# Patient Record
Sex: MALE | Race: WHITE
[De-identification: names, ages, dates, MRNs, and addresses within clinical notes are randomized per-mention and may not be internally consistent; named-entity substitution may affect disease eponyms.]

---

## 2017-10-09 ENCOUNTER — HOSPITAL ENCOUNTER (OUTPATIENT)
Dept: HOSPITAL 45 - C.LAB | Age: 47
Discharge: HOME | End: 2017-10-09
Attending: FAMILY MEDICINE
Payer: COMMERCIAL

## 2017-10-09 DIAGNOSIS — I10: Primary | ICD-10-CM

## 2017-10-09 DIAGNOSIS — M54.9: ICD-10-CM

## 2017-10-09 DIAGNOSIS — E66.01: ICD-10-CM

## 2017-10-09 LAB
ALBUMIN/GLOB SERPL: 0.9 {RATIO} (ref 0.9–2)
ALP SERPL-CCNC: 95 U/L (ref 45–117)
ALT SERPL-CCNC: 36 U/L (ref 12–78)
ANION GAP SERPL CALC-SCNC: 7 MMOL/L (ref 3–11)
AST SERPL-CCNC: 18 U/L (ref 15–37)
BUN SERPL-MCNC: 20 MG/DL (ref 7–18)
BUN/CREAT SERPL: 23.2 (ref 10–20)
CALCIUM SERPL-MCNC: 8.3 MG/DL (ref 8.5–10.1)
CHLORIDE SERPL-SCNC: 107 MMOL/L (ref 98–107)
CHOLEST/HDLC SERPL: 3.7 {RATIO}
CO2 SERPL-SCNC: 28 MMOL/L (ref 21–32)
CREAT SERPL-MCNC: 0.87 MG/DL (ref 0.6–1.4)
GLOBULIN SER-MCNC: 3.8 GM/DL (ref 2.5–4)
GLUCOSE SERPL-MCNC: 94 MG/DL (ref 70–99)
GLUCOSE UR QL: 42 MG/DL
KETONES UR QL STRIP: 74 MG/DL
NITRITE UR QL STRIP: 200 MG/DL (ref 0–150)
PH UR: 156 MG/DL (ref 0–200)
POTASSIUM SERPL-SCNC: 3.7 MMOL/L (ref 3.5–5.1)
SODIUM SERPL-SCNC: 142 MMOL/L (ref 136–145)
TSH SERPL-ACNC: 2.33 UIU/ML (ref 0.3–4.5)
VERY LOW DENSITY LIPOPROT CALC: 40 MG/DL

## 2018-03-13 ENCOUNTER — HOSPITAL ENCOUNTER (OUTPATIENT)
Dept: HOSPITAL 45 - C.RDSM | Age: 48
Discharge: HOME | End: 2018-03-13
Attending: PHYSICAL MEDICINE & REHABILITATION
Payer: COMMERCIAL

## 2018-03-13 DIAGNOSIS — M54.9: Primary | ICD-10-CM

## 2018-04-11 ENCOUNTER — HOSPITAL ENCOUNTER (EMERGENCY)
Dept: HOSPITAL 45 - C.EDB | Age: 48
Discharge: HOME | End: 2018-04-11
Payer: COMMERCIAL

## 2018-04-11 VITALS
HEIGHT: 65.98 IN | BODY MASS INDEX: 50.62 KG/M2 | WEIGHT: 315 LBS | BODY MASS INDEX: 50.62 KG/M2 | WEIGHT: 315 LBS | HEIGHT: 65.98 IN

## 2018-04-11 VITALS
SYSTOLIC BLOOD PRESSURE: 189 MMHG | DIASTOLIC BLOOD PRESSURE: 94 MMHG | OXYGEN SATURATION: 94 % | TEMPERATURE: 97.7 F | HEART RATE: 93 BPM

## 2018-04-11 DIAGNOSIS — K08.89: Primary | ICD-10-CM

## 2018-04-11 DIAGNOSIS — I10: ICD-10-CM

## 2018-04-12 NOTE — EMERGENCY ROOM VISIT NOTE
ED Visit Note


First contact with patient:  17:57


CHIEF COMPLAINT:  Toothache. 





HISTORY OF PRESENT ILLNESS:  Mr. Montenegro is a 47-year-old white male who 

ambulates into the complaining of dental pain.


Historically patient reports he has history dental disease but has not been 

able to follow-up with the dentist for multiple years because he has had no 

dental insurance.


He goes on to report that he had a crown over the left maxillary area that was 

no longer present.  He said he believes he may have swallowed the crown 3 days 

ago but is unsure all he knows is that the crown is missing.  He reports he has 

been having ongoing progressive dental pain in the left maxillary area.  He 

describes this as a deep achy and throbbing sensation.  He rates his discomfort 

8/10.  The pain is nonradiating.  The pain worsens with palpation of the gum 

and the decayed teeth in this area.  As well as chewing.  He has not identified 

any alleviating factors related to the pain.  He reports he has been taken over-

the-counter medication without relief of his discomfort.  He denies any 

associated symptoms including fevers, chills, sweats, skin eruptions, facial 

swelling, sore throat, upper respiratory tract symptoms, difficulty swallowing, 

decreased appetite, nausea/vomiting.


 


REVIEW OF SYSTEMS:  As noted above in History of Present Illness.  8 body 

systems were reviewed with this patient and found to be negative unless noted 

above otherwise.





PMH: Hypertension, status post lumbar spinal fusion.


CURRENT MEDICATION: Patient denies..


ALLERGIES TO MEDICATION: Patient denies.


SOCIAL HISTORY: Patient is unemployed; he feels safe in his home environment; 

he denies tobacco and alcohol use.





PHYSICAL EXAM: 


Vital Signs: 








  Date Time  Temp Pulse Resp B/P (MAP) Pulse Ox O2 Delivery O2 Flow Rate FiO2


 


4/11/18 18:00 36.5 93 18 189/94 94 Room Air  





General: 47 year-old white male in mild distress due to pain, nontoxic appearing

, afebrile and hemodynamically stable.


Neurological: Awake, alert and oriented to person, place and time.  Answering 

questions appropriately and following commands. 


Skin: Warm, dry and pink.  No facial swelling or erythema.


HEENT: Atraumatic and normocephalic. Oral cavity is moist and pink. Airway is 

patent. Uvula is midline and no abscesses are seen. Speech is normal.  Patient 

has poor dentition throughout his mouth.  He has all his teeth decayed to the 

gingiva.  There is mild erythema and edema over the left maxillary gingiva.  

There is moderate tenderness throughout this area but I was not able to palpate 

any abscesses.  No cervical or submandibular lymphadenopathy.





ED COURSE:


Patient is assessed as noted above.


Patient is educated about his findings and instructed on his treatment plan; he 

verbalizes understanding and agreement with this plan.


    


CLINIC IMPRESSION: Dental pain.





DISPOSITION: Patient discharged home in stable condition; prior to departure 

she was reassessed and subjectively reported she was feeling the same.





PLAN:


Comfort measures were discussed with the patient including a sliding pain 

medication scale of ibuprofen, acetaminophen and Norco; his name was checked on 

state database and no red flags were noted and he was given appropriate 

narcotic precautions.


Other comfort measures discussed with the patient was the use of dental wax and 

a room temperature mechanical soft/liquid diet.


Patient was encouraged to brush teeth, flossing gargle with salt water for 5 

times a day.


Patient was encouraged to avoid tobacco use.


Patient was encouraged to contact his insurance company tomorrow and inform 

them of today's ED visit and request referral to dentist/oral surgery that is 

covered by his insurance.


Patient was encouraged return the ED for worsening/uncontrolled pain, fevers, 

facial swelling or any new/concerning symptoms.